# Patient Record
Sex: MALE | Race: WHITE | Employment: UNEMPLOYED | URBAN - METROPOLITAN AREA
[De-identification: names, ages, dates, MRNs, and addresses within clinical notes are randomized per-mention and may not be internally consistent; named-entity substitution may affect disease eponyms.]

---

## 2020-01-27 ENCOUNTER — COMMUNICATION - HEALTHEAST (OUTPATIENT)
Dept: BEHAVIORAL HEALTH | Facility: CLINIC | Age: 30
End: 2020-01-27

## 2020-01-31 ENCOUNTER — COMMUNICATION - HEALTHEAST (OUTPATIENT)
Dept: BEHAVIORAL HEALTH | Facility: CLINIC | Age: 30
End: 2020-01-31

## 2020-10-05 ENCOUNTER — HOSPITAL ENCOUNTER (INPATIENT)
Facility: CLINIC | Age: 30
LOS: 1 days | Discharge: LEFT BEFORE DISCHARGE | DRG: 918 | End: 2020-10-06
Attending: EMERGENCY MEDICINE | Admitting: INTERNAL MEDICINE

## 2020-10-05 DIAGNOSIS — T40.604A OPIATE OVERDOSE, UNDETERMINED INTENT, INITIAL ENCOUNTER (H): ICD-10-CM

## 2020-10-05 LAB
ALBUMIN SERPL-MCNC: 3.5 G/DL (ref 3.4–5)
ALBUMIN UR-MCNC: NEGATIVE MG/DL
ALP SERPL-CCNC: 197 U/L (ref 40–150)
ALT SERPL W P-5'-P-CCNC: 193 U/L (ref 0–70)
AMPHETAMINES UR QL SCN: POSITIVE
ANION GAP SERPL CALCULATED.3IONS-SCNC: 4 MMOL/L (ref 3–14)
APAP SERPL-MCNC: <2 MG/L (ref 10–20)
APPEARANCE UR: CLEAR
AST SERPL W P-5'-P-CCNC: 70 U/L (ref 0–45)
BARBITURATES UR QL: NEGATIVE
BASOPHILS # BLD AUTO: 0 10E9/L (ref 0–0.2)
BASOPHILS NFR BLD AUTO: 0.2 %
BENZODIAZ UR QL: NEGATIVE
BILIRUB SERPL-MCNC: 0.2 MG/DL (ref 0.2–1.3)
BILIRUB UR QL STRIP: NEGATIVE
BUN SERPL-MCNC: 18 MG/DL (ref 7–30)
CALCIUM SERPL-MCNC: 8.5 MG/DL (ref 8.5–10.1)
CANNABINOIDS UR QL SCN: NEGATIVE
CHLORIDE SERPL-SCNC: 107 MMOL/L (ref 94–109)
CO2 SERPL-SCNC: 27 MMOL/L (ref 20–32)
COCAINE UR QL: NEGATIVE
COLOR UR AUTO: YELLOW
CREAT SERPL-MCNC: 0.65 MG/DL (ref 0.66–1.25)
DIFFERENTIAL METHOD BLD: ABNORMAL
EOSINOPHIL # BLD AUTO: 0.4 10E9/L (ref 0–0.7)
EOSINOPHIL NFR BLD AUTO: 3.5 %
ERYTHROCYTE [DISTWIDTH] IN BLOOD BY AUTOMATED COUNT: 14.2 % (ref 10–15)
ETHANOL SERPL-MCNC: <0.01 G/DL
GFR SERPL CREATININE-BSD FRML MDRD: >90 ML/MIN/{1.73_M2}
GLUCOSE BLDC GLUCOMTR-MCNC: 64 MG/DL (ref 70–99)
GLUCOSE BLDC GLUCOMTR-MCNC: 72 MG/DL (ref 70–99)
GLUCOSE SERPL-MCNC: 91 MG/DL (ref 70–99)
GLUCOSE UR STRIP-MCNC: NEGATIVE MG/DL
HCT VFR BLD AUTO: 38.7 % (ref 40–53)
HGB BLD-MCNC: 12.4 G/DL (ref 13.3–17.7)
HGB UR QL STRIP: NEGATIVE
IMM GRANULOCYTES # BLD: 0 10E9/L (ref 0–0.4)
IMM GRANULOCYTES NFR BLD: 0.3 %
KETONES UR STRIP-MCNC: NEGATIVE MG/DL
LEUKOCYTE ESTERASE UR QL STRIP: NEGATIVE
LYMPHOCYTES # BLD AUTO: 2.5 10E9/L (ref 0.8–5.3)
LYMPHOCYTES NFR BLD AUTO: 22.9 %
MCH RBC QN AUTO: 29.8 PG (ref 26.5–33)
MCHC RBC AUTO-ENTMCNC: 32 G/DL (ref 31.5–36.5)
MCV RBC AUTO: 93 FL (ref 78–100)
MONOCYTES # BLD AUTO: 1 10E9/L (ref 0–1.3)
MONOCYTES NFR BLD AUTO: 9.3 %
MUCOUS THREADS #/AREA URNS LPF: PRESENT /LPF
NEUTROPHILS # BLD AUTO: 6.8 10E9/L (ref 1.6–8.3)
NEUTROPHILS NFR BLD AUTO: 63.8 %
NITRATE UR QL: NEGATIVE
NRBC # BLD AUTO: 0 10*3/UL
NRBC BLD AUTO-RTO: 0 /100
OPIATES UR QL SCN: POSITIVE
PCP UR QL SCN: NEGATIVE
PH UR STRIP: 5.5 PH (ref 5–7)
PLATELET # BLD AUTO: 282 10E9/L (ref 150–450)
POTASSIUM SERPL-SCNC: 4 MMOL/L (ref 3.4–5.3)
PROT SERPL-MCNC: 7.2 G/DL (ref 6.8–8.8)
RBC # BLD AUTO: 4.16 10E12/L (ref 4.4–5.9)
RBC #/AREA URNS AUTO: 0 /HPF (ref 0–2)
SODIUM SERPL-SCNC: 138 MMOL/L (ref 133–144)
SOURCE: ABNORMAL
SP GR UR STRIP: 1.02 (ref 1–1.03)
UROBILINOGEN UR STRIP-MCNC: NORMAL MG/DL (ref 0–2)
WBC # BLD AUTO: 10.7 10E9/L (ref 4–11)
WBC #/AREA URNS AUTO: <1 /HPF (ref 0–5)

## 2020-10-05 PROCEDURE — 96376 TX/PRO/DX INJ SAME DRUG ADON: CPT

## 2020-10-05 PROCEDURE — 81001 URINALYSIS AUTO W/SCOPE: CPT | Performed by: EMERGENCY MEDICINE

## 2020-10-05 PROCEDURE — 258N000003 HC RX IP 258 OP 636: Performed by: EMERGENCY MEDICINE

## 2020-10-05 PROCEDURE — 80320 DRUG SCREEN QUANTALCOHOLS: CPT | Performed by: EMERGENCY MEDICINE

## 2020-10-05 PROCEDURE — 93005 ELECTROCARDIOGRAM TRACING: CPT

## 2020-10-05 PROCEDURE — 85025 COMPLETE CBC W/AUTO DIFF WBC: CPT | Performed by: EMERGENCY MEDICINE

## 2020-10-05 PROCEDURE — 99285 EMERGENCY DEPT VISIT HI MDM: CPT | Mod: 25

## 2020-10-05 PROCEDURE — 250N000011 HC RX IP 250 OP 636: Performed by: EMERGENCY MEDICINE

## 2020-10-05 PROCEDURE — 200N000001 HC R&B ICU

## 2020-10-05 PROCEDURE — 80053 COMPREHEN METABOLIC PANEL: CPT | Performed by: EMERGENCY MEDICINE

## 2020-10-05 PROCEDURE — C9803 HOPD COVID-19 SPEC COLLECT: HCPCS

## 2020-10-05 PROCEDURE — 99222 1ST HOSP IP/OBS MODERATE 55: CPT | Mod: AI | Performed by: INTERNAL MEDICINE

## 2020-10-05 PROCEDURE — 80307 DRUG TEST PRSMV CHEM ANLYZR: CPT | Performed by: EMERGENCY MEDICINE

## 2020-10-05 PROCEDURE — U0003 INFECTIOUS AGENT DETECTION BY NUCLEIC ACID (DNA OR RNA); SEVERE ACUTE RESPIRATORY SYNDROME CORONAVIRUS 2 (SARS-COV-2) (CORONAVIRUS DISEASE [COVID-19]), AMPLIFIED PROBE TECHNIQUE, MAKING USE OF HIGH THROUGHPUT TECHNOLOGIES AS DESCRIBED BY CMS-2020-01-R: HCPCS | Performed by: EMERGENCY MEDICINE

## 2020-10-05 PROCEDURE — 96365 THER/PROPH/DIAG IV INF INIT: CPT

## 2020-10-05 PROCEDURE — 999N001017 HC STATISTIC GLUCOSE BY METER IP

## 2020-10-05 PROCEDURE — 80329 ANALGESICS NON-OPIOID 1 OR 2: CPT | Performed by: EMERGENCY MEDICINE

## 2020-10-05 RX ORDER — NALOXONE HYDROCHLORIDE 1 MG/ML
1 INJECTION INTRAMUSCULAR; INTRAVENOUS; SUBCUTANEOUS ONCE
Status: COMPLETED | OUTPATIENT
Start: 2020-10-05 | End: 2020-10-05

## 2020-10-05 RX ADMIN — NALOXONE HYDROCHLORIDE 1 MG: 1 INJECTION PARENTERAL at 20:52

## 2020-10-05 RX ADMIN — NALOXONE HYDROCHLORIDE 0.2 MG/HR: 1 INJECTION PARENTERAL at 22:56

## 2020-10-05 RX ADMIN — NALOXONE HYDROCHLORIDE 1 MG: 1 INJECTION PARENTERAL at 22:22

## 2020-10-06 VITALS
DIASTOLIC BLOOD PRESSURE: 67 MMHG | BODY MASS INDEX: 23.6 KG/M2 | SYSTOLIC BLOOD PRESSURE: 115 MMHG | HEART RATE: 65 BPM | WEIGHT: 159.83 LBS | RESPIRATION RATE: 14 BRPM | TEMPERATURE: 98.6 F | OXYGEN SATURATION: 98 %

## 2020-10-06 LAB
ALBUMIN SERPL-MCNC: 3 G/DL (ref 3.4–5)
ALP SERPL-CCNC: 162 U/L (ref 40–150)
ALT SERPL W P-5'-P-CCNC: 155 U/L (ref 0–70)
ANION GAP SERPL CALCULATED.3IONS-SCNC: 2 MMOL/L (ref 3–14)
AST SERPL W P-5'-P-CCNC: 50 U/L (ref 0–45)
BILIRUB SERPL-MCNC: 0.3 MG/DL (ref 0.2–1.3)
BUN SERPL-MCNC: 14 MG/DL (ref 7–30)
CALCIUM SERPL-MCNC: 8 MG/DL (ref 8.5–10.1)
CHLORIDE SERPL-SCNC: 109 MMOL/L (ref 94–109)
CO2 SERPL-SCNC: 29 MMOL/L (ref 20–32)
CREAT SERPL-MCNC: 0.65 MG/DL (ref 0.66–1.25)
GFR SERPL CREATININE-BSD FRML MDRD: >90 ML/MIN/{1.73_M2}
GLUCOSE BLDC GLUCOMTR-MCNC: 103 MG/DL (ref 70–99)
GLUCOSE SERPL-MCNC: 104 MG/DL (ref 70–99)
INTERPRETATION ECG - MUSE: NORMAL
POTASSIUM SERPL-SCNC: 3.5 MMOL/L (ref 3.4–5.3)
PROT SERPL-MCNC: 6.4 G/DL (ref 6.8–8.8)
SARS-COV-2 RNA SPEC QL NAA+PROBE: NOT DETECTED
SODIUM SERPL-SCNC: 140 MMOL/L (ref 133–144)
SPECIMEN SOURCE: NORMAL

## 2020-10-06 PROCEDURE — 80053 COMPREHEN METABOLIC PANEL: CPT | Performed by: INTERNAL MEDICINE

## 2020-10-06 PROCEDURE — 99232 SBSQ HOSP IP/OBS MODERATE 35: CPT | Performed by: INTERNAL MEDICINE

## 2020-10-06 PROCEDURE — 36415 COLL VENOUS BLD VENIPUNCTURE: CPT | Performed by: INTERNAL MEDICINE

## 2020-10-06 PROCEDURE — 999N001017 HC STATISTIC GLUCOSE BY METER IP

## 2020-10-06 PROCEDURE — 258N000003 HC RX IP 258 OP 636: Performed by: INTERNAL MEDICINE

## 2020-10-06 RX ORDER — AMOXICILLIN 250 MG
1 CAPSULE ORAL 2 TIMES DAILY PRN
Status: DISCONTINUED | OUTPATIENT
Start: 2020-10-06 | End: 2020-10-06 | Stop reason: HOSPADM

## 2020-10-06 RX ORDER — ONDANSETRON 2 MG/ML
4 INJECTION INTRAMUSCULAR; INTRAVENOUS EVERY 6 HOURS PRN
Status: DISCONTINUED | OUTPATIENT
Start: 2020-10-06 | End: 2020-10-06 | Stop reason: HOSPADM

## 2020-10-06 RX ORDER — NALOXONE HYDROCHLORIDE 0.4 MG/ML
.1-.4 INJECTION, SOLUTION INTRAMUSCULAR; INTRAVENOUS; SUBCUTANEOUS
Status: DISCONTINUED | OUTPATIENT
Start: 2020-10-06 | End: 2020-10-06 | Stop reason: HOSPADM

## 2020-10-06 RX ORDER — AMOXICILLIN 250 MG
2 CAPSULE ORAL 2 TIMES DAILY PRN
Status: DISCONTINUED | OUTPATIENT
Start: 2020-10-06 | End: 2020-10-06 | Stop reason: HOSPADM

## 2020-10-06 RX ORDER — ONDANSETRON 4 MG/1
4 TABLET, ORALLY DISINTEGRATING ORAL EVERY 6 HOURS PRN
Status: DISCONTINUED | OUTPATIENT
Start: 2020-10-06 | End: 2020-10-06 | Stop reason: HOSPADM

## 2020-10-06 RX ORDER — SODIUM CHLORIDE 9 MG/ML
INJECTION, SOLUTION INTRAVENOUS CONTINUOUS
Status: DISCONTINUED | OUTPATIENT
Start: 2020-10-06 | End: 2020-10-06 | Stop reason: HOSPADM

## 2020-10-06 RX ADMIN — SODIUM CHLORIDE: 9 INJECTION, SOLUTION INTRAVENOUS at 07:36

## 2020-10-06 RX ADMIN — SODIUM CHLORIDE: 9 INJECTION, SOLUTION INTRAVENOUS at 00:25

## 2020-10-06 ASSESSMENT — ACTIVITIES OF DAILY LIVING (ADL)
ADLS_ACUITY_SCORE: 17
ADLS_ACUITY_SCORE: 18
ADLS_ACUITY_SCORE: 17

## 2020-10-06 NOTE — ED TRIAGE NOTES
Pt was found passed out on bathroom floor at the [facility] transit station. EMS arrived and say he was awake and sitting on the bench. Denies taking anything. EMS reports he drifts off to sleep quickly.

## 2020-10-06 NOTE — PROGRESS NOTES
VSS. No c/o pain. CIWA 0. Pt alert and oriented on arrival. Became more lethargic with decreased RR and narcan gtt was increased to 0.3. Pt is pleasant and cooperative. Voiding in urinal. Pt did not want mother updated.

## 2020-10-06 NOTE — ED NOTES
"Bed: ED20  Expected date:   Expected time:   Means of arrival:   Comments:  511  30M Overdose  \"No covid\" 2030  "

## 2020-10-06 NOTE — H&P
[facility]    History and Physical  Hospitalist       Date of Admission:  10/5/2020  Date of Service (when I saw the patient): 10/05/20    Assessment & Plan   Main Norman is a 30 year old male who presents with altered mental status    Asymptomatic COVID-19 sent, low suspicion    Opiate overdose  Methamphetamine use disorder  H/o Etoh use disorder  H/o cocaine use  Per Care Everywhere has been to ED in past for opiate overdose as well as methamphetamine use. Pt was found passed out on the bathroom floor at the [facility] transit [place]. He denies drug use but per EMS had syringes on him. Pt states took Xanax x 1 tablet off [address]. In ED given narcan x 1 IV and woke up for awhile but then became more somnolent and minimally responsive. BS low/ normal. Vitals stable. Labs remarkable for AST 70/ , alk phos of 197. Blood alcohol negative, urine tox positive for ampheamines and opiates, negative for benzos. Unclear if recent alcohol.   - ICU, telemetry, continuous O2 monitoring  - narcan gtt  - CIWA   - IV fluids  - prn antiemetics    Elevated LFTs  With elevated AST/ALT and alk phos on admission. No recent comparison. Denies abdominal pain. Suspect related to the above.   - repeat labs in the am    DVT Prophylaxis: Pneumatic Compression Devices and Ambulate every shift  Code Status: Full Code    Disposition: Expected discharge in 1-2 days    Manny Kinney MD  [phone] (P)  Text Page     Primary Care Physician   Physician No Ref-Primary    Chief Complaint   Altered mental status    History is obtained from the patient and medical records    History of Present Illness   Main Norman is a 30 year old male who presents with mental status.  Patient has a history of opiate and methamphetamine use disorder.  On the day of presentation he apparently was found at the [facility] unconscious.  He was brought into the emergency department.  He was given Narcan with  improvement in his mental status.  Once more conscious he denied any opiate or methamphetamine use.  He states he was given a Xanax x1 tablet and took this and caused the problem.  Urine drug screen in the emergency department was positive for methamphetamines and opiates.  The time of visit he is somnolent but alert.  He denies any chest pain or shortness of breath.  Denies abdominal pain.  He again states he has not used heroin for very long time and meth for a while either.    Past Medical History    I have reviewed this patient's medical history and updated it with pertinent information if needed.   Past Medical History:   Diagnosis Date     Substance abuse (H)        Past Surgical History   I have reviewed this patient's surgical history and updated it with pertinent information if needed.  No past surgical history on file.    Prior to Admission Medications   None     Allergies   No Known Allergies    Social History   I have reviewed this patient's social history and updated it with pertinent information if needed. Main Norman  reports that he has been smoking. He has been smoking about 0.25 packs per day. He has never used smokeless tobacco. He reports current drug use. He reports that he does not drink alcohol.    Family History   I have reviewed this patient's family history and updated it with pertinent information if needed.   DM in grandmother    Review of Systems   The 10 point Review of Systems is negative other than noted in the HPI or here.     Physical Exam   Temp: 97.9  F (36.6  C) Temp src: Oral BP: 114/56 Pulse: 77   Resp: 24 SpO2: 99 % O2 Device: None (Room air)    Vital Signs with Ranges  0 lbs 0 oz    Constitutional: alert, somnolent, no distress  Eyes: EOMI, pupils pinpoint  HEENT: OP clear  Respiratory: CTA B without w/c  Cardiovascular: RRR without m/r/g  GI: soft, nontender, nondistended, no HSM  Lymph/Hematologic: no cervical LAD  Genitourinary: deferred  Skin: no rashes or  lesions grossly. Track marks on arms  Musculoskeletal: no deformities or arthritis  Neurologic: CN II-XII, TOMPKINS, sensation grossly intact  Psychiatric:somnolent, unable to assess    Data   Data reviewed today:  I personally reviewed no images or EKG's today.  Recent Labs   Lab 10/05/20  2050   WBC 10.7   HGB 12.4*   MCV 93         POTASSIUM 4.0   CHLORIDE 107   CO2 27   BUN 18   CR 0.65*   ANIONGAP 4   HILDA 8.5   GLC 91   ALBUMIN 3.5   PROTTOTAL 7.2   BILITOTAL 0.2   ALKPHOS 197*   *   AST 70*       No results found for this or any previous visit (from the past 24 hour(s)).

## 2020-10-06 NOTE — PROGRESS NOTES
Pt. Not found in room, belongings missing. Called pt's cell phone and left message. Security notified.

## 2020-10-06 NOTE — PROGRESS NOTES
[facility]  Hospitalist Progress Note  Bryan Black MD  10/06/2020    Assessment & Plan   Main Norman is a 30 year old male who presents with altered mental status     Asymptomatic COVID-19 sent, low suspicion     Opiate overdose  Methamphetamine use disorder  H/o Etoh use disorder  H/o cocaine use  Per Care Everywhere has been to ED in past for opiate overdose as well as methamphetamine use. Pt was found passed out on the bathroom floor at the [facility] transit center. He denies drug use but per EMS had syringes on him. Pt states took Xanax x 1 tablet off street. In ED given narcan x 1 IV and woke up for awhile but then became more somnolent and minimally responsive. BS low/ normal. Vitals stable. Labs remarkable for AST 70/ , alk phos of 197. Blood alcohol negative, urine tox positive for ampheamines and opiates, negative for benzos. Unclear if recent alcohol.   - ICU, telemetry, continuous O2 monitoring is stable overnight  - discontinued narcan gtt  - CIWA score is zero  - IV fluids, SL if taking adequate po  - prn antiemetics  - transfer to [facility] telemetry  - CD evaluation     Elevated LFTs  With elevated AST/ALT and alk phos on admission. No recent comparison. Denies abdominal pain. Suspect related to the above.   - repeat labs improved, will not check further     DVT Prophylaxis: Pneumatic Compression Devices and Ambulate every shift    Code Status: Full Code     Disposition: Expected discharge on 10/7       Interval History   -- patient seen in ICU  -- he is back to baseline  -- does not endorse withdrawal symptoms    -Data reviewed today: I reviewed all new labs and imaging over the last 24 hours. I personally reviewed no images or EKG's today.    Physical Exam    , Blood pressure 125/85, pulse 87, temperature 98.6  F (37  C), temperature source Oral, resp. rate 14, weight 72.8 kg (160 lb 6.4 oz), SpO2 97 %.  Vitals:    10/06/20 0000   Weight: 72.8 kg (160 lb 6.4  oz)     Vital Signs with Ranges  Temp:  [97.9  F (36.6  C)-98.7  F (37.1  C)] 98.6  F (37  C)  Pulse:  [64-93] 87  Resp:  [7-24] 14  BP: (106-127)/(56-95) 125/85  SpO2:  [97 %-100 %] 97 %  I/O's Last 24 hours  I/O last 3 completed shifts:  In: 697.92 [I.V.:697.92]  Out: 700 [Urine:700]    Constitutional: Awake, alert, cooperative, no apparent distress  Respiratory: Clear to auscultation bilaterally, no crackles or wheezing  Cardiovascular: Regular rate and rhythm, normal S1 and S2, and no murmur noted  GI: Normal bowel sounds, soft, non-distended, non-tender  Skin/Integumen: No rashes, no cyanosis, no edema  Other:      Medications   All medications were reviewed.    sodium chloride 75 mL/hr at 10/06/20 1225          Data   Recent Labs   Lab 10/06/20  0503 10/05/20  2050   WBC  --  10.7   HGB  --  12.4*   MCV  --  93   PLT  --  282    138   POTASSIUM 3.5 4.0   CHLORIDE 109 107   CO2 29 27   BUN 14 18   CR 0.65* 0.65*   ANIONGAP 2* 4   HILDA 8.0* 8.5   * 91   ALBUMIN 3.0* 3.5   PROTTOTAL 6.4* 7.2   BILITOTAL 0.3 0.2   ALKPHOS 162* 197*   * 193*   AST 50* 70*       No results found for this or any previous visit (from the past 24 hour(s)).    Bryan Black MD  Text Page  (7am to 6pm)

## 2020-10-06 NOTE — ED PROVIDER NOTES
History   Chief Complaint  Drug Overdose    The history is provided by the patient and the EMS personnel. The history is limited by the condition of the patient.      Main Norman is a 30 year old male with a history of substance abuse (methadone and opiates) who presents via EMS after he was found passed out on the bathroom floor at the [facility] transit [place]. EMS was called and upon their arrival he was awake and responsive. He denies any IV drug use in the past year, but EMS reports he had syringes on him and track marks. The patient reports he took a single tablet of Xanax from off the street (not prescribed) and crashed. He will not admit to using any other drugs or alcohol. He denies any suicidal ideation or thoughts of self injury.     En route, the patient's oxygen saturations on room air were 100%, although his respiratory rate dropped to 8 momentarily while sleeping. He was not given any Narcan. They state that he has been awake, but falling asleep very easily.     Allergies  Fish allergy    Medications  The patient is not currently taking any prescribed medications.    Past Medical History  Substance abuse  Concussion  MRSA   Cellulitis  Pinguecula     Past Surgical History   History reviewed.  No pertinent past surgical history.    Family History  History reviewed. No pertinent family history.    Social History  The patient was unaccompanied to the ED.  Smoking Status: everyday smoker  Smokeless Tobacco: never  Alcohol Use: no  Drug Use: yes, previously methadone and opioids     Review of Systems   Unable to perform ROS: Mental status change   Psychiatric/Behavioral: Negative for suicidal ideas.   All other systems reviewed and are negative.      Physical Exam     Patient Vitals for the past 24 hrs:   BP Temp Temp src Pulse Resp SpO2   10/05/20 2200 107/74 -- -- 75 11 98 %   10/05/20 2154 -- -- -- -- 16 98 %   10/05/20 2150 106/74 -- -- 80 13 98 %   10/05/20 2140 109/76 -- -- 82 19 99 %    10/05/20 2136 -- -- -- -- 16 98 %   10/05/20 2130 115/77 -- -- 79 20 100 %   10/05/20 2115 (!) 127/95 -- -- 84 12 100 %   10/05/20 2051 126/79 -- -- 68 11 100 %   10/05/20 2038 (!) 120/95 97.9  F (36.6  C) Oral 71 12 100 %       Physical Exam  Nursing note and vitals reviewed.  Constitutional:  Minimally awake and altered.  HENT:   Nose:    Nose normal.   Mouth/Throat:   Mucous membranes are normal.   Eyes:    Conjunctivae normal and EOM are normal.      Pin point pupils.   Neck:    Trachea normal.   Cardiovascular:  Normal rate, regular rhythm, normal heart sounds and normal pulses. No murmur heard.  Pulmonary/Chest:  Effort normal and breath sounds normal.   Abdominal:   Soft. Normal appearance and bowel sounds are normal.      There is no tenderness.      There is no rebound and no CVA tenderness.   Musculoskeletal:  Extremities atraumatic x 4.   Lymphadenopathy:  No cervical adenopathy.   Neurological:   Minimally awake and altered. Normal strength.      No cranial nerve deficit or sensory deficit. GCS eye subscore is 4. GCS verbal subscore is 5. GCS motor subscore is 6.   Skin:    Skin is intact. No rash noted.   Psychiatric:   Minimally awake.    Emergency Department Course   EKG  Indication: overdose  Time: 2044  Rate 71 bpm. WV interval 138. QRS duration 100. QT/QTc 392/425. P-R-T axes 45 18 20.   Normal sinus rhythm  Nonspecific ST abnormality  Abnormal ECG   Read time: 2048  Read by Wally Gomez MD    Laboratory:  Laboratory findings were communicated with the patient who voiced understanding of the findings.    Glucose by Meter (Collected 2135): 72  Glucose by Meter (Collected 2108): 64 (L)    UA with Microscopic: mucous present o/w WNL  Drug abuse screen 77 urine: positive for amphetamine and opiates o/w negative    Asymptomatic COVID-19 virus by PCR: pending     Alcohol ethyl: <0.01  Acetaminophen: <2    CMP: creatinine 0.65 (L), alkphos 197 (H),  (H),  (H), AST 70 (H) o/w  WNL  CBC: HGB 12.4 (L) o/w WNL (WBC 10.7, )    Interventions:  2022 Narcan 1 mg IV  2052 Narcan 1 mg IV    Emergency Department Course:  Past medical records, nursing notes, and vitals reviewed.    0832 Patient arrived via EMS and I physically examined the patient as documented above.    EKG obtained in the ED, see results above.     The patient provided a urine sample here in the emergency department. This was sent for laboratory testing, findings above.    2220 I rechecked the patient and discussed the findings of their workup thus far.    A Nasopharyngeal swab was obtained here in the ED.    2251 I consulted with Dr. Kinney of hospitalist service.      Findings and plan explained to the Patient who consents to admission. Discussed the patient with Dr. Kinney, who will admit the patient to a ICU bed for further monitoring, evaluation, and treatment.    I personally reviewed the laboratory results with the Patient and answered all related questions prior to admission.     Impression & Plan   Covid-19  Main Norman was evaluated during a global COVID-19 pandemic, which necessitated consideration that the patient might be at risk for infection with the SARS-CoV-2 virus that causes COVID-19.   Applicable protocols for evaluation were followed during the patient's care.   COVID-19 was considered as part of the patient's evaluation. The plan for testing is:  a test was obtained during this visit.    Medical Decision Making:  This is a 30-year-old male brought in by EMS for altered mental status.  He was quite sleepy upon arrival with pinpoint pupils, and therefore I felt he likely overdosed on opiates despite his claim of taking Xanax.  He was provided an IV dose of Narcan, and he woke up appropriately.  We watched him for a while, and unfortunately he became quite sleepy and minimally responsive again, although he did not drop his oxygen saturation levels.  He was provided another IV dose of  Narcan, which woke him up appropriately again.  Given the recurrent need for Narcan, he was then started on a Narcan drip, and we will be admitting him to the hospital.  I subsequently spoke with Dr. Kinney, who will be admitting him to the ICU.    Diagnosis:    ICD-10-CM    1. Opiate overdose, undetermined intent, initial encounter (H)  T40.604A CBC with platelets differential     Comprehensive metabolic panel     Alcohol ethyl     Asymptomatic COVID-19 Virus (Coronavirus) by PCR     Disposition:  Admitted to Dr. Kinney.    Scribe Disclosure:  I, Romy Natarajan, am serving as a scribe at 8:42 PM on 10/5/2020 to document services personally performed by Wally Gomez MD based on my observations and the provider's statements to me.      Wally Goemz MD  10/05/20 4626

## 2020-10-06 NOTE — ED NOTES
Pt somnolent. RR 16. Pt will awaken to tactile stimulation but drifts back to sleep. Blood sugar rechecked. Remains on cardiac, BP, and O2 monitor.

## 2020-10-07 NOTE — PROGRESS NOTES
Interacted with pt. At 1530 for shift change report. Pt was asking if he could smoke. Was told the hospital was non-smoking. Pt stated that he was looking for his cell phone. Cell phone not listed as being with pt upon admission. Called ICU where pt had transferred form on previous shift. Cell phone not found  in ICU either.  At 1630 pt's IV was beeping , IV fluid found disconnected  And laying on bed. Hospital gown found on bed, pt's belongings not in room. Called pt's cell phone and left a message. Security notified as per above note.

## 2021-06-05 NOTE — TELEPHONE ENCOUNTER
Patient was referred to the Out-patient mental health and addiction clinic through the Bridging service. Patient did not show at the clinic today. Called patient and left message to call the clinic if he needs assistance or has questions.

## 2021-06-05 NOTE — TELEPHONE ENCOUNTER
Called to follow up with bridging service patient. Call went straight to voicemail which was in Samoan and did not give the option to leave a message.

## 2021-07-24 ENCOUNTER — HOSPITAL ENCOUNTER (EMERGENCY)
Facility: CLINIC | Age: 31
Discharge: HOME | End: 2021-07-24
Attending: EMERGENCY MEDICINE | Admitting: EMERGENCY MEDICINE

## 2021-07-24 VITALS
WEIGHT: 160 LBS | TEMPERATURE: 98.3 F | RESPIRATION RATE: 14 BRPM | SYSTOLIC BLOOD PRESSURE: 109 MMHG | BODY MASS INDEX: 23.7 KG/M2 | DIASTOLIC BLOOD PRESSURE: 74 MMHG | OXYGEN SATURATION: 100 % | HEART RATE: 101 BPM | HEIGHT: 69 IN

## 2021-07-24 DIAGNOSIS — T40.2X1A OPIOID OVERDOSE, ACCIDENTAL OR UNINTENTIONAL, INITIAL ENCOUNTER (H): ICD-10-CM

## 2021-07-24 PROCEDURE — 99283 EMERGENCY DEPT VISIT LOW MDM: CPT

## 2021-07-24 ASSESSMENT — ENCOUNTER SYMPTOMS
DIAPHORESIS: 0
VOMITING: 0
MYALGIAS: 0
NAUSEA: 0
FEVER: 0
HALLUCINATIONS: 0

## 2021-07-24 ASSESSMENT — MIFFLIN-ST. JEOR: SCORE: 1661.14

## 2021-07-24 NOTE — ED TRIAGE NOTES
EMS report: was at [employer] in [place] with girlfriend. Admits to smoking heroin today. Reports has been sober for 8 months but relapsed today. PD gave 3 auto-injections of narcan (total 12 mg) prior to patient waking up. VSS stable for EMS.

## 2021-07-24 NOTE — ED PROVIDER NOTES
"  History   Chief Complaint:  Drug Overdose     HPI   Main Norman is a 31 year old male with history of substance abuse who presents after a drug overdose. The patient reports that earlier this evening he smoked heroin and accidentally overdosed. Police were called and administered 12 mg of narcan before the patient became responsive. In the emergency department, the patient states that he had bee sober from heroin for 7 months prior to his relapse yesterday. He denies any medical symptoms at this time, including pain, nausea, diaphoresis, vomiting, fevers, suicidal ideation, homicidal ideation or hallucinations. The patient is vaccinated against COVID-19.     Review of Systems   Constitutional: Negative for diaphoresis and fever.   Gastrointestinal: Negative for nausea and vomiting.   Musculoskeletal: Negative for myalgias.   Psychiatric/Behavioral: Negative for hallucinations and suicidal ideas.   All other systems reviewed and are negative.      Allergies:  The patient has no known allergies.     Medications:  The patient is not currently taking any prescribed medications.    Past Medical History:    Substance abuse  Opiate overdose    MRSA  Cellulitis    Social History:  The patient presents alone.  He states he smoked heroin today.     Physical Exam     Patient Vitals for the past 24 hrs:   BP Temp Temp src Pulse Resp SpO2 Height Weight   07/24/21 1922 -- 98.3  F (36.8  C) Oral -- -- -- -- --   07/24/21 1722 109/74 -- -- 101 14 100 % 1.753 m (5' 9\") 72.6 kg (160 lb)       Physical Exam  Constitutional: Well developed, nontox appearance  Head: Atraumatic.   Mouth/Throat: Oropharynx is clear and moist.   Neck:  no stridor  Eyes: no scleral icterus  Cardiovascular: RRR, 2+ bilat radial pulses  Pulmonary/Chest: nml resp effort, Clear BS bilat  Abdominal: ND, soft, NT, no rebound or guarding   Ext: Warm, well perfused, no edema  Neurological: A&O, symmetric facies, moves ext x4  Skin: Skin is warm and " dry.   Psychiatric: Behavior is normal. Thought content normal.  Denies SI/HI, does not appear to be responding to internal stimuli  Nursing note and vitals reviewed.    Emergency Department Course     Emergency Department Course:    Reviewed:  I reviewed nursing notes, vitals, past medical history and care everywhere    Assessments:   I obtained history and examined the patient as noted above.    I rechecked and updated the patient.      Disposition:  The patient was discharged to home.       Impression & Plan   CMS Diagnoses: none    Medical Decision Makin-year-old male presenting status post heroin inhalation, unresponsiveness and Narcan administration     Differential diagnosis includes opioid dependence, opioid abuse, polysubstance abuse.  Patient denies SI/HI, does not appear to be an intentional acute danger to himself or others.  Patient received Narcan prior to arrival.  Recommended that he remain in the emergency department for monitoring for approximately 2 to 3 hours should he metabolizes Narcan and become opioid intoxicated again.  Patient is agreeable at time of initial evaluation.  Patient required no further doses of Narcan in the emergency department.  He was monitored for approximately 2.5 hours in the ED.  At this time I feel the pt is safe for discharge.  Recommendations given regarding follow up with PCP and return to the emergency department as needed for new or worsening symptoms.  Pt counseled on disposition and diagnosis.  They are understanding and agreeable to plan. Patient discharged in stable condition.  He declined resources for polysubstance abuse counseling.      Covid-19  Main Norman was evaluated during a global COVID-19 pandemic, which necessitated consideration that the patient might be at risk for infection with the SARS-CoV-2 virus that causes COVID-19.   Applicable protocols for evaluation were followed during the patient's care.       Diagnosis:     ICD-10-CM    1. Opioid overdose, accidental or unintentional, initial encounter (H)  T40.2X1A        Discharge Medications:  None     Scribe Disclosure:  I, Charles Proctor, am serving as a scribe at 5:38 PM on 7/24/2021 to document services personally performed by Deandre Rachel MD based on my observations and the provider's statements to me.              Deandre Rachel MD  07/25/21 1018

## 2021-07-24 NOTE — ED NOTES
Bed: ED17  Expected date: 7/24/21  Expected time: 5:10 PM  Means of arrival: Ambulance  Comments:  532 31m narcs, narcan, awake ETA 1710

## 2021-07-25 NOTE — ED NOTES
Pt calm, alert, compliant, and w/o compliant. md in to f/u with pt. Pt dcd with rx and instructions.

## 2021-07-25 NOTE — DISCHARGE INSTRUCTIONS
Please stop using heroin.    Please return to the emergency department as needed for new or worsening symptoms including thoughts of self-harm or harming others, thoughts of suicide, any other concerning symptoms.

## 2022-03-04 ENCOUNTER — HOSPITAL ENCOUNTER (EMERGENCY)
Facility: CLINIC | Age: 32
Discharge: OTHER INSTITUTION | End: 2022-03-04
Attending: EMERGENCY MEDICINE | Admitting: EMERGENCY MEDICINE

## 2022-03-04 VITALS
HEART RATE: 65 BPM | TEMPERATURE: 98.2 F | WEIGHT: 130 LBS | DIASTOLIC BLOOD PRESSURE: 55 MMHG | OXYGEN SATURATION: 98 % | BODY MASS INDEX: 19.7 KG/M2 | SYSTOLIC BLOOD PRESSURE: 102 MMHG | RESPIRATION RATE: 18 BRPM | HEIGHT: 68 IN

## 2022-03-04 DIAGNOSIS — T50.904A INGESTION OF UNKNOWN DRUG, UNDETERMINED INTENT, INITIAL ENCOUNTER: ICD-10-CM

## 2022-03-04 LAB — SARS-COV-2 RNA RESP QL NAA+PROBE: NEGATIVE

## 2022-03-04 PROCEDURE — 87635 SARS-COV-2 COVID-19 AMP PRB: CPT | Performed by: EMERGENCY MEDICINE

## 2022-03-04 PROCEDURE — C9803 HOPD COVID-19 SPEC COLLECT: HCPCS

## 2022-03-04 PROCEDURE — 99285 EMERGENCY DEPT VISIT HI MDM: CPT | Mod: 25

## 2022-03-04 PROCEDURE — 99285 EMERGENCY DEPT VISIT HI MDM: CPT

## 2022-03-04 ASSESSMENT — ENCOUNTER SYMPTOMS: FATIGUE: 1

## 2022-03-04 NOTE — ED NOTES
He was being arrested by the Police, for fleeing a , drug possession and paraphernalia, and  a false name.   He told the police that he swallowed a gram of Fentanyl, according to EMS his vital signs are stable, normal EKG , blood sugar 100 .  Patient is stable.

## 2022-03-04 NOTE — ED NOTES
Bed: [facility]  Expected date:   Expected time:   Means of arrival:   Comments:  [facility] - 31 M in custody swallowed drugs eta 1310

## 2022-03-04 NOTE — DISCHARGE INSTRUCTIONS
Please come back to the ER immediately with any worsening symptoms, or any new symptoms that develop.  Please follow with your regular doctor the next 1 week for a follow-up.

## 2022-03-04 NOTE — ED PROVIDER NOTES
"  History   Chief Complaint:  Drug Problem (Patient reported to the Police that he swallowed 1 gram of Fentany, while he was being arrested for fleeing a , drug paraphernalia, given officer a false name.)       The history is provided by the patient and the EMS personnel.      Main Zarate is a 31 year old male who presents via EMS with drug ingestion. He was getting arrested by the police for fleeing a , drug possession and paraphernalia, and  a false name. He told the officer that ingested 1 g of fentanyl. EMS were called who stated his vitals were normal and he was responsive. Main states that he actually ingested half the amount he told the police and that it a normal dose. He also used fentanyl earlier today. He states he feels tired and is cold. He is unsure if this is related to the fentanyl.     Review of Systems   Constitutional: Positive for fatigue.   All other systems reviewed and are negative.        Allergies:  No Known Allergies    Medications:  The patient is currently on no regular medications.    Past Medical History:     The patient denies any significant past medical history.    Social History:  Presents alone via EMS  Uses fentanyl     Physical Exam     Patient Vitals for the past 24 hrs:   BP Temp Temp src Pulse Resp SpO2 Height Weight   03/04/22 1321 132/83 98.2  F (36.8  C) Temporal 74 16 100 % 1.727 m (5' 8\") 59 kg (130 lb)       Physical Exam  Vitals: reviewed by me  General: Pt seen on [facility] [place], pleasant, cooperative, and alert to conversation  Eyes: Tracking well, clear conjunctiva BL  ENT: MMM, midline trachea.   Lungs: No tachypnea, no accessory muscle use. No respiratory distress.   CV: Rate as above  Abd: Soft, non tender, no guarding, no rebound. Non distended  MSK: no joint effusion.  No evidence of trauma  Skin: No rash  Neuro: Clear speech and no facial droop.  Psych: Not RIS, no e/o AH/VH, no SI or HI     Emergency " Department Course   Laboratory:  Labs Ordered and Resulted from Time of ED Arrival to Time of ED Departure - No data to display       Emergency Department Course:    Reviewed:  I reviewed nursing notes, vitals and past medical history    Assessments:  1340 I obtained history and examined the patient as noted above.     Consults:  8371 I spoke with Poison Control regarding the patients history and presentation in the ED.    Disposition:  Patient was signed out to oncoming ED physician, Dr Adams    Impression & Plan     Medical Decision Making:  This is a pleasant 31-year-old male presents the emergency room with what appears to be an intentional overdose of fentanyl, with the intent to hide the drugs from police.  He is not in custody, but was issued a citation.  He is doing very well here, no medical complaints, tells me he feels tired but did do fentanyl earlier this morning as well.  Based on my discussion with the poison center, he should be watched until 630 tonight, and if he has to get Narcan at any time, he should be admitted.  Currently he is resting comfortably without issue.  Denies suicidal ideation, patient is quite forthright and open with us.    Diagnosis:    ICD-10-CM    1. Ingestion of unknown drug, undetermined intent, initial encounter  T50.904A        Discharge Medications:  New Prescriptions    No medications on file       Scribe Disclosure:  I, Lisette Logan, am serving as a scribe at 1:50 PM on 3/4/2022 to document services personally performed by Alessandro Ramos* based on my observations and the provider's statements to me.            Alessandro Ramos MD  03/04/22 3074

## 2022-03-04 NOTE — ED NOTES
Report received from DEMAR Desir. Care of patient assumed.    Patient is resting quietly on cart with eyes closed. Respirations are regular and unlabored. Patient repositions self independently on cart. Continue to monitor.

## 2022-03-04 NOTE — PHARMACY-ADMISSION MEDICATION HISTORY
Pharmacy Medication History  Admission medication history interview status for the 3/4/2022  admission is complete. See EPIC admission navigator for prior to admission medications     Location of Interview: Patient room  Medication history sources: Patient    Significant changes made to the medication list:  None    In the past week, patient estimated taking medication this percent of the time: NA    Additional medication history information:   -Patient denies taking any Rx or OTC meds at home on a regular basis.     Medication reconciliation completed by provider prior to medication history? No    Time spent in this activity: 5 mins    Prior to Admission medications    Not on File       The information provided in this note is only as accurate as the sources available at the time of update(s)

## 2022-03-05 NOTE — ED NOTES
Patient provided with meal tray. Updated on continued POC and wait for discharge. Patient expresses desire to be sent for detoxification. MD notified. No new orders at present. Continue to monitor.

## 2022-03-05 NOTE — ED NOTES
Patient refused offered meal tray and apple sauce.     Patient resting on cart. Respirations are regular and unlabored. Patient repositions self independently on cart. Bed in lowest postiion with side rails up x 2. Continue to monitor.

## 2022-03-05 NOTE — ED NOTES
Spoke with staff at poison control and gave an update.   Per poison control, Pt has been cleared and chart has be completed and closed.    Missy Whitney RN,.......................................... 3/4/2022   7:45 PM

## 2022-03-05 NOTE — ED NOTES
Patient is resting quietly on cart with eyes closed. Respirations are regular and unlabored. Patient repositions self independently on cart. Continue to monitor.    Meal tray ordered for patient. Patient requests soft foods.

## 2022-03-05 NOTE — ED NOTES
Transfer to: [address] [place] [address]    Unit: Detox    Phone No.: [phone]    Report Called: Not yet. (Covid Results pending)    Missy Whitney RN,.......................................... 3/4/2022   8:00 PM

## 2022-03-05 NOTE — ED PROVIDER NOTES
Sign Out Note    Pt accepted in sign out from: Dr. Ramos    Briefly pt presented to the ED for: Substance use.  The patient was playing from police when he swallowed a baggy full of fentanyl that he reports was 1 g which is his typical amount that he uses daily.  He denies any other substance use.    Plan at time of sign out: Patient could be discharged at 6:30 PM per recommendation of poison control as long as he has continued to not need Narcan.    Care of patient during my shift: The RN reviewed the plan and was going to discharge the patient at 6:30 PM, but he was requesting to go to detox.  I was able to evaluate the patient at 7:30 PM.  He denies any other substance or alcohol use.  I did talk to detox who has accepted the patient as long as his Covid test results negative.  A Covid swab was ordered.  The patient does not take any daily medications to warrant need for prescriptions to be sent with him.    Plan for patient at this time: discharge to detox.     Timmy Adams,   03/04/22 1947

## 2022-03-05 NOTE — ED NOTES
Report called to [address] [place] [address].  H/E transport set up.    Missy Whitney RN,.......................................... 3/4/2022   8:45 PM

## 2022-03-05 NOTE — ED NOTES
"Assumed care of pt at this time.    Report: Pt has been laying in bed quietly. Declined his dinner stating that he is not hungry. Pt is requesting to go to detox. Pt advised MD will be notified.   Pt okay with being covid tested. Pt is not on a hold as he is voluntary    To be completed: Covid test    Current Vitals: /73   Pulse 66   Temp 98.2  F (36.8  C) (Temporal)   Resp 16   Ht 1.727 m (5' 8\")   Wt 59 kg (130 lb)   SpO2 98%   BMI 19.77 kg/m      Belongings: In locker    Disposition: Detox if bed is available.      Video Observation initiated, patient informed.     Missy Whitney RN      "

## 2022-03-25 ENCOUNTER — HOSPITAL ENCOUNTER (EMERGENCY)
Facility: CLINIC | Age: 32
Discharge: HOME | End: 2022-03-26
Attending: EMERGENCY MEDICINE | Admitting: EMERGENCY MEDICINE

## 2022-03-25 DIAGNOSIS — F19.10 POLYSUBSTANCE ABUSE (H): ICD-10-CM

## 2022-03-25 LAB
ALBUMIN SERPL-MCNC: 2.8 G/DL (ref 3.4–5)
ALP SERPL-CCNC: 205 U/L (ref 40–150)
ALT SERPL W P-5'-P-CCNC: 107 U/L (ref 0–70)
ANION GAP SERPL CALCULATED.3IONS-SCNC: 2 MMOL/L (ref 3–14)
AST SERPL W P-5'-P-CCNC: 79 U/L (ref 0–45)
BASOPHILS # BLD AUTO: 0 10E3/UL (ref 0–0.2)
BASOPHILS NFR BLD AUTO: 1 %
BILIRUB SERPL-MCNC: 0.3 MG/DL (ref 0.2–1.3)
BUN SERPL-MCNC: 11 MG/DL (ref 7–30)
CALCIUM SERPL-MCNC: 8 MG/DL (ref 8.5–10.1)
CHLORIDE BLD-SCNC: 107 MMOL/L (ref 94–109)
CO2 SERPL-SCNC: 28 MMOL/L (ref 20–32)
COHGB MFR BLD: 1.8 % (ref 0–2)
CREAT SERPL-MCNC: 0.66 MG/DL (ref 0.66–1.25)
EOSINOPHIL # BLD AUTO: 0.3 10E3/UL (ref 0–0.7)
EOSINOPHIL NFR BLD AUTO: 5 %
ERYTHROCYTE [DISTWIDTH] IN BLOOD BY AUTOMATED COUNT: 13 % (ref 10–15)
ETHANOL SERPL-MCNC: <0.01 G/DL
GFR SERPL CREATININE-BSD FRML MDRD: >90 ML/MIN/1.73M2
GLUCOSE BLD-MCNC: 102 MG/DL (ref 70–99)
HCT VFR BLD AUTO: 38.3 % (ref 40–53)
HGB BLD-MCNC: 12.3 G/DL (ref 13.3–17.7)
IMM GRANULOCYTES # BLD: 0 10E3/UL
IMM GRANULOCYTES NFR BLD: 0 %
LYMPHOCYTES # BLD AUTO: 1.9 10E3/UL (ref 0.8–5.3)
LYMPHOCYTES NFR BLD AUTO: 28 %
MCH RBC QN AUTO: 30 PG (ref 26.5–33)
MCHC RBC AUTO-ENTMCNC: 32.1 G/DL (ref 31.5–36.5)
MCV RBC AUTO: 93 FL (ref 78–100)
MONOCYTES # BLD AUTO: 0.6 10E3/UL (ref 0–1.3)
MONOCYTES NFR BLD AUTO: 10 %
NEUTROPHILS # BLD AUTO: 3.8 10E3/UL (ref 1.6–8.3)
NEUTROPHILS NFR BLD AUTO: 56 %
NRBC # BLD AUTO: 0 10E3/UL
NRBC BLD AUTO-RTO: 0 /100
PLATELET # BLD AUTO: 193 10E3/UL (ref 150–450)
POTASSIUM BLD-SCNC: 3.7 MMOL/L (ref 3.4–5.3)
PROT SERPL-MCNC: 6.2 G/DL (ref 6.8–8.8)
RBC # BLD AUTO: 4.1 10E6/UL (ref 4.4–5.9)
SODIUM SERPL-SCNC: 137 MMOL/L (ref 133–144)
WBC # BLD AUTO: 6.6 10E3/UL (ref 4–11)

## 2022-03-25 PROCEDURE — 82077 ASSAY SPEC XCP UR&BREATH IA: CPT | Performed by: EMERGENCY MEDICINE

## 2022-03-25 PROCEDURE — 82040 ASSAY OF SERUM ALBUMIN: CPT | Performed by: EMERGENCY MEDICINE

## 2022-03-25 PROCEDURE — 93005 ELECTROCARDIOGRAM TRACING: CPT

## 2022-03-25 PROCEDURE — 82375 ASSAY CARBOXYHB QUANT: CPT | Performed by: EMERGENCY MEDICINE

## 2022-03-25 PROCEDURE — 36415 COLL VENOUS BLD VENIPUNCTURE: CPT | Performed by: EMERGENCY MEDICINE

## 2022-03-25 PROCEDURE — 80053 COMPREHEN METABOLIC PANEL: CPT | Performed by: EMERGENCY MEDICINE

## 2022-03-25 PROCEDURE — 96361 HYDRATE IV INFUSION ADD-ON: CPT

## 2022-03-25 PROCEDURE — 99284 EMERGENCY DEPT VISIT MOD MDM: CPT | Mod: 25

## 2022-03-25 PROCEDURE — 85025 COMPLETE CBC W/AUTO DIFF WBC: CPT | Performed by: EMERGENCY MEDICINE

## 2022-03-25 PROCEDURE — 258N000003 HC RX IP 258 OP 636: Performed by: EMERGENCY MEDICINE

## 2022-03-25 PROCEDURE — 96360 HYDRATION IV INFUSION INIT: CPT

## 2022-03-25 RX ADMIN — SODIUM CHLORIDE 1000 ML: 9 INJECTION, SOLUTION INTRAVENOUS at 23:22

## 2022-03-26 VITALS
TEMPERATURE: 98.4 F | SYSTOLIC BLOOD PRESSURE: 100 MMHG | OXYGEN SATURATION: 99 % | HEART RATE: 56 BPM | RESPIRATION RATE: 19 BRPM | DIASTOLIC BLOOD PRESSURE: 69 MMHG

## 2022-03-26 LAB
HOLD SPECIMEN: NORMAL
HOLD SPECIMEN: NORMAL

## 2022-03-26 NOTE — ED TRIAGE NOTES
"Pt arrives via EMS. Per PD pt was \"passed out in car in intersection.\" Upon arrival to PD department pt was passed out in back of car. PD called EMS. Burnt tinfoil and drug paraphanelia. Pt told EMS he had taken heroin 20 min prior to being with PD. ABCs intact.   "

## 2022-03-26 NOTE — ED NOTES
Pt and room searched and cleared for pt safety. Pt put on hold. Belongings removed and put in DEC office. Pt changed into mental health scrubs.

## 2022-03-26 NOTE — ED PROVIDER NOTES
History   Chief Complaint:  Drug Overdose       The history is provided by the patient and the police.      Main Norman is a 31 year old male who presents with drug overdose. He was sitting in his car when police came to arrest him due to multiple violations. When they arrived they woke him up and state that he was coherent the entire time. When the got to the police department he became unresponsive. He did not receive narcan. Police state that he took heroin 20 minutes before police arrived on site. Patient states that he had a relapse and smoked meth and fentanyl right before police came. He denies suicidal ideation.    Review of Systems   Psychiatric/Behavioral: Negative for suicidal ideas.   All other systems reviewed and are negative.        Allergies:  No Known Allergies     Medications:  The patient is currently on no regular medications.     Past Medical History:     The patient denies any significant past medical history.     Social History:  Presents alone via EMS  Uses fentanyl and meth    Physical Exam     Patient Vitals for the past 24 hrs:   BP Temp Temp src Pulse Resp SpO2   03/25/22 2238 102/76 -- -- -- -- --   03/25/22 2224 -- 98.4  F (36.9  C) Oral 76 19 100 %       Physical Exam  Constitutional:  Oriented to person, place, and time. Mildly sedated. Answering questions.   HENT:   Head:    Normocephalic.   Mouth/Throat:   Oropharynx is clear and moist.   Eyes:    EOM are normal. Pupils are equal, round, and reactive to light.   Neck:    Neck supple.   Cardiovascular:  Normal rate, regular rhythm and normal heart sounds.      Exam reveals no gallop and no friction rub.       No murmur heard.  Pulmonary/Chest:  Effort normal and breath sounds normal.      No respiratory distress. No wheezes. No rales.      No reproducible chest wall pain.  Abdominal:   Soft. No distension. No tenderness. No rebound and no guarding.   Musculoskeletal:  Normal range of motion.   Neurological:   Alert and  oriented to person, place, and time. Mildly sedated. Answering questions.           Moves all 4 extremities spontaneously    Skin:    No rash noted. No pallor.   Psychiatric:                 Denies suicidal ideation.     Emergency Department Course   ECG  ECG obtained at 2239, ECG read at 2241  Normal sinus rhythm  Minimal voltage criteria for LVH, may be normal variant  Borderline ECG   No significant changes as compared to prior, dated 10/05/2020.  Rate 62 bpm. HI interval 126 ms. QRS duration 94 ms. QT/QTc 394/399 ms. P-R-T axes 35 1 3.         Laboratory:  Labs Ordered and Resulted from Time of ED Arrival to Time of ED Departure   COMPREHENSIVE METABOLIC PANEL - Abnormal       Result Value    Sodium 137      Potassium 3.7      Chloride 107      Carbon Dioxide (CO2) 28      Anion Gap 2 (*)     Urea Nitrogen 11      Creatinine 0.66      Calcium 8.0 (*)     Glucose 102 (*)     Alkaline Phosphatase 205 (*)     AST 79 (*)      (*)     Protein Total 6.2 (*)     Albumin 2.8 (*)     Bilirubin Total 0.3      GFR Estimate >90     CBC WITH PLATELETS AND DIFFERENTIAL - Abnormal    WBC Count 6.6      RBC Count 4.10 (*)     Hemoglobin 12.3 (*)     Hematocrit 38.3 (*)     MCV 93      MCH 30.0      MCHC 32.1      RDW 13.0      Platelet Count 193      % Neutrophils 56      % Lymphocytes 28      % Monocytes 10      % Eosinophils 5      % Basophils 1      % Immature Granulocytes 0      NRBCs per 100 WBC 0      Absolute Neutrophils 3.8      Absolute Lymphocytes 1.9      Absolute Monocytes 0.6      Absolute Eosinophils 0.3      Absolute Basophils 0.0      Absolute Immature Granulocytes 0.0      Absolute NRBCs 0.0     ETHYL ALCOHOL LEVEL - Normal    Alcohol ethyl <0.01     CARBON MONOXIDE - Normal    Carbon Monoxide 1.8            Emergency Department Course:    Reviewed:  I reviewed nursing notes, vitals, past medical history and Care Everywhere    Assessments:  2234 I obtained history and examined the patient as noted above.     I rechecked the patient and explained findings.       Consults:  8671 I spoke with Poison Control regarding the patients history and presentation in the ED.      Interventions:  NS 1 liter IV    Disposition:  The patient was discharged to home.     Impression & Plan     Medical Decision Making:  Main Norman is a 31 year old male that came in for intoxication after smoking methamphetamine and heroin. He states that he did swallow a bag to avoid arrest. He denies that this was a attempt of self harm and denies suicidal ideation. He initially appeared sleepy but not requiring narcan. He is not clinically sober. As per Poison Control's recommendation he has been observed for 8 hours. He is not agitated, not sedated and is medically clear at this point. He is otherwise appropriate for to be discharged and told to return for any new symptoms or concerns.        Diagnosis:    ICD-10-CM    1. Polysubstance abuse (H)  F19.10        Discharge Medications:  New Prescriptions    No medications on file       Scribe Disclosure:  I, Lisette Logan, am serving as a scribe at 11:11 PM on 3/25/2022 to document services personally performed by Ranulfo Holt MD based on my observations and the provider's statements to me.              Ranulfo Holt MD  03/26/22 0567

## 2022-03-26 NOTE — ED NOTES
Patient ambulated to restroom and back to room without requiring assistance. Patient given new warm blanket, a turkey sandwich tray, and water.

## 2022-03-28 LAB
ATRIAL RATE - MUSE: 62 BPM
DIASTOLIC BLOOD PRESSURE - MUSE: NORMAL MMHG
INTERPRETATION ECG - MUSE: NORMAL
P AXIS - MUSE: 35 DEGREES
PR INTERVAL - MUSE: 126 MS
QRS DURATION - MUSE: 94 MS
QT - MUSE: 394 MS
QTC - MUSE: 399 MS
R AXIS - MUSE: 1 DEGREES
SYSTOLIC BLOOD PRESSURE - MUSE: NORMAL MMHG
T AXIS - MUSE: 3 DEGREES
VENTRICULAR RATE- MUSE: 62 BPM